# Patient Record
Sex: FEMALE | Race: WHITE | Employment: FULL TIME | URBAN - METROPOLITAN AREA
[De-identification: names, ages, dates, MRNs, and addresses within clinical notes are randomized per-mention and may not be internally consistent; named-entity substitution may affect disease eponyms.]

---

## 2018-10-04 ENCOUNTER — OFFICE VISIT (OUTPATIENT)
Dept: NEUROLOGY | Age: 43
End: 2018-10-04

## 2018-10-04 VITALS
HEIGHT: 66 IN | OXYGEN SATURATION: 98 % | HEART RATE: 88 BPM | WEIGHT: 121 LBS | DIASTOLIC BLOOD PRESSURE: 62 MMHG | SYSTOLIC BLOOD PRESSURE: 105 MMHG | BODY MASS INDEX: 19.44 KG/M2

## 2018-10-04 DIAGNOSIS — G43.711 INTRACTABLE CHRONIC MIGRAINE WITHOUT AURA AND WITH STATUS MIGRAINOSUS: Primary | ICD-10-CM

## 2018-10-04 DIAGNOSIS — M79.10 MYALGIA: ICD-10-CM

## 2018-10-04 PROBLEM — G89.29 CHRONIC BILATERAL LOW BACK PAIN WITH BILATERAL SCIATICA: Status: ACTIVE | Noted: 2018-10-04

## 2018-10-04 PROBLEM — M54.42 CHRONIC BILATERAL LOW BACK PAIN WITH BILATERAL SCIATICA: Status: ACTIVE | Noted: 2018-10-04

## 2018-10-04 PROBLEM — F84.0 AUTISM: Status: ACTIVE | Noted: 2018-10-04

## 2018-10-04 PROBLEM — M54.41 CHRONIC BILATERAL LOW BACK PAIN WITH BILATERAL SCIATICA: Status: ACTIVE | Noted: 2018-10-04

## 2018-10-04 RX ORDER — BUTALBITAL, ACETAMINOPHEN AND CAFFEINE 300; 40; 50 MG/1; MG/1; MG/1
1 CAPSULE ORAL
Qty: 60 CAP | Refills: 5 | Status: SHIPPED | OUTPATIENT
Start: 2018-10-04 | End: 2020-02-14

## 2018-10-04 RX ORDER — DIAZEPAM 2 MG/1
10 TABLET ORAL
Qty: 60 TAB | Refills: 2 | Status: SHIPPED | OUTPATIENT
Start: 2018-10-04 | End: 2019-04-17 | Stop reason: SDUPTHER

## 2018-10-04 NOTE — PROGRESS NOTES
Name: Fortunato Devine Chief Complaint: headaches Marina David returns for a follow up  visit she has been doing well. She continues to get widespread myalgia. She is getting acupuncture She was tested for RA (her mother has it). She was seen by a rheumatologist.  She felt unsatisfied with her lack of diagnosis. We discussed rheumatoid arthritis the necessity for full criteria for the diagnosis. Discussed the etiology of pain with drop in atmospheric pressure. Reviewed my old notes. Last visit was 2016 was on valium and fioricet and has been relatively well maintained. Assesment and Plan 1. Intractable chronic migraine without aura and with status migrainosus Continue fioricet 2. myalgia Continue valium Does not meet criteria for FM diagnosis 3. ADD Stable Allergies Review of patient's allergies indicates no known allergies. Medications No current outpatient prescriptions on file. No current facility-administered medications for this visit. Medical History Migraine headaches Myalgia 
add Review of Systems Constitutional: Negative for chills and fever. HENT: Negative for ear pain. Eyes: Negative for pain and discharge. Respiratory: Negative for cough and hemoptysis. Cardiovascular: Negative for chest pain and claudication. Gastrointestinal: Negative for constipation and diarrhea. Genitourinary: Negative for flank pain and hematuria. Musculoskeletal: Positive for back pain, joint pain and myalgias. Skin: Negative for itching and rash. Neurological: Positive for headaches. Endo/Heme/Allergies: Negative for environmental allergies. Does not bruise/bleed easily. Psychiatric/Behavioral: Negative for depression and hallucinations. The patient is nervous/anxious. Exam: 
Visit Vitals  /62  Pulse 88  Ht 5' 6\" (1.676 m)  Wt 121 lb (54.9 kg)  SpO2 98%  BMI 19.53 kg/m2 General: Well developed, well nourished. Patient in no apparent distress Head: Normocephalic, atraumatic, anicteric sclera Neck Normal ROM, No thyromegally Lungs:  Normal breath sounds and rate Cardiac: Regular rate and rhythm Ext: No pedal edema Skin: Supple no rash NeurologicExam: 
Mental Status: Alert and oriented to person place and time Speech: Fluent no aphasia or dysarthria. Cranial Nerves:  II - XII Intact Motor:  Full and symmetric strength of upper and lower proximal and distal muscles. Normal bulk and tone. Reflexes:   Deep tendon reflexes 2+/4 and symmetric. Gait:  Gait is balanced and fluid with normal arm swing. Tremor:   No tremor noted. Cerebellar:  Coordination intact. Neurovascular: No carotid bruits. No JVD Imaging MRI Results (most recent): 
 
Results from Hospital Encounter encounter on 09/29/10 MRI SPINE CERVICAL WITHOUT CONTRAST Narrative Final Report ICD Codes / Adm. Diagnosis:    /   CERVICOGENIC HEADACHES Examination:  MRI [facility] SPINE  CON  - [id number] - Sep 29 2010  6:21PM 
Accession No:  [id number] Reason:  CERVICOGENIC HEADACH 
 
 
REPORT: 
Cervical spine MRI examination consists of sagittal STIR and T2 and T1 and  
axial gradient-echo and T1-weighted sequences. STIR images demonstrate no pathologic high signal intensity foci in cervical  
spine bone marrow. The cord is of normal size and signal intensity. No tonsillar herniation is  
demonstrated. Disc spaces appear adequately maintained. No intra or extradural mass is demonstrated. IMPRESSION:  Negative Interpreting/Reading Doctor: Tamiko Elizabeth ([id number]) Transcribed: clt on 09/30/2010 Darcy Devi ([id number])  09/30/2010 Distribution:  Attending Doctor: Wendy Rai Alternate Doctor: Wendy Rai

## 2018-10-04 NOTE — PATIENT INSTRUCTIONS

## 2018-10-04 NOTE — MR AVS SNAPSHOT
[address], 
[id number], [address] [facility] 
[phone] Patient: Ziggy Pelletier MRN: [id number] :1975 Visit Information Date & Time Provider Department Dept. Phone Encounter #  
 10/4/2018  9:00 AM Winsome Brown MD [facility] [phone] [id number] Follow-up Instructions Return if symptoms worsen or fail to improve. Upcoming Health Maintenance Date Due Pneumococcal 19-64 Medium Risk (1 of 1 - PPSV23) 10/30/1995 DTaP/Tdap/Td series (1 - Tdap) 10/30/1997 PAP AKA CERVICAL CYTOLOGY 10/30/1997 Influenza Age 5 to Adult 2018 Allergies as of 10/4/2018  Review Complete On: 10/4/2018 By: Winsome Brown MD  
 No Known Allergies Current Immunizations  Never Reviewed No immunizations on file. Not reviewed this visit You Were Diagnosed With   
  
 Codes Comments Intractable chronic migraine without aura and with status migrainosus    -  Primary ICD-10-CM: B59.929 ICD-9-CM: 346.73 Myalgia     ICD-10-CM: M79.10 ICD-9-CM: 729.1 Vitals BP Pulse Height(growth percentile) Weight(growth percentile) SpO2 BMI  
 105/62 88 5' 6\" (1.676 m) 121 lb (54.9 kg) 98% 19.53 kg/m2 OB Status Smoking Status Having regular periods Current Every Day Smoker BMI and BSA Data Body Mass Index Body Surface Area  
 19.53 kg/m 2 1.6 m 2 Preferred Pharmacy Pharmacy Name Phone [facility] [phone] Your Updated Medication List  
  
   
This list is accurate as of 10/4/18 10:07 AM.  Always use your most recent med list.  
  
  
  
  
 butalbital-acetaminophen-caff -40 mg per capsule Commonly known as:  [employer] Take 1 Cap by mouth every twelve (12) hours as needed for Pain.  
  
 diazePAM 2 mg tablet Commonly known as:  VALIUM  
 Take 5 Tabs by mouth every twelve (12) hours as needed for Anxiety. Max Daily Amount: 20 mg.  
  
  
  
  
Prescriptions Printed Refills  
 diazePAM (VALIUM) 2 mg tablet 2 Sig: Take 5 Tabs by mouth every twelve (12) hours as needed for Anxiety. Max Daily Amount: 20 mg.  
 Class: Print Route: Oral  
  
Prescriptions Sent to Pharmacy Refills  
 butalbital-acetaminophen-caff (FIORICET) -40 mg per capsule 5 Sig: Take 1 Cap by mouth every twelve (12) hours as needed for Pain. Class: Normal  
 Pharmacy: [facility] Ph #: [phone] Route: Oral  
  
Follow-up Instructions Return if symptoms worsen or fail to improve. Patient Instructions Office Policies · Phone calls/patient messages: Please allow up to 24 hours for someone in the office to contact you about your call or message. Be mindful your provider may be out of the office or your message may require further review. We encourage you to use [employer] for your messages as this is a faster, more efficient way to communicate with our office · Medication Refills: 
Prescription medications require up to 48 business hours to process. We encourage you to use [employer] for your refills. For controlled medications: Please allow up to 72 business hours to process. Certain medications may require you to  a written prescription at our office. NO narcotic/controlled medications will be prescribed after 4pm Monday through Friday or on weekends · Form/Paperwork Completion: 
Please note there is a $25 fee for all paperwork completed by our providers. We ask that you allow 7-14 business days. Pre-payment is due prior to picking up/faxing the completed form. You may also download your forms to [employer] to have your doctor print off. Introducing \[facility] [place] [facility]\"" & HEALTH SERVICES!    
 [employer] introduces [employer] patient portal. Now you can access parts of your medical record, email your doctor's office, and request medication refills online. 1. In your internet browser, go to [email or web address] 2. Click on the First Time User? Click Here link in the Sign In box. You will see the New Member Sign Up page. 3. Enter your [employer] Access Code exactly as it appears below. You will not need to use this code after youve completed the sign-up process. If you do not sign up before the expiration date, you must request a new code. · [employer] Access Code: [email or web address] Expires: 1/2/2019  8:37 AM 
 
4. Enter the last four digits of your Social Security Number (xxxx) and Date of Birth (mm/dd/yyyy) as indicated and click Submit. You will be taken to the next sign-up page. 5. Create a [employer] ID. This will be your [employer] login ID and cannot be changed, so think of one that is secure and easy to remember. 6. Create a [employer] password. You can change your password at any time. 7. Enter your Password Reset Question and Answer. This can be used at a later time if you forget your password. 8. Enter your e-mail address. You will receive e-mail notification when new information is available in [address] 9. Click Sign Up. You can now view and download portions of your medical record. 10. Click the Download Summary menu link to download a portable copy of your medical information. If you have questions, please visit the Frequently Asked Questions section of the [employer] website. Remember, [employer] is NOT to be used for urgent needs. For medical emergencies, dial 911. Now available from your iPhone and Android! Please provide this summary of care documentation to your next provider. Your primary care clinician is listed as Cassia Randhawa. If you have any questions after today's visit, please call [phone].

## 2018-10-08 ENCOUNTER — TELEPHONE (OUTPATIENT)
Dept: NEUROLOGY | Age: 43
End: 2018-10-08

## 2019-04-17 DIAGNOSIS — M79.10 MYALGIA: ICD-10-CM

## 2019-04-17 RX ORDER — DIAZEPAM 2 MG/1
10 TABLET ORAL
Qty: 60 TAB | Refills: 0 | Status: SHIPPED | OUTPATIENT
Start: 2019-04-17 | End: 2020-02-14

## 2019-04-19 ENCOUNTER — TELEPHONE (OUTPATIENT)
Dept: NEUROLOGY | Age: 44
End: 2019-04-19

## 2019-04-19 NOTE — TELEPHONE ENCOUNTER
[facility] spoke Emerson Weller and provided the script over the phone     diazePAM (VALIUM) 2 mg tablet [[id number]]     Order Details   Dose: 10 mg Route: Oral Frequency: EVERY 12 HOURS AS NEEDED for Anxiety   Note to Pharmacy:   Needs appointment for future refills        Dispense Quantity: 60 Tab Refills: 0 Fills remaining: --           Sig: Take 5 Tabs by mouth every twelve (12) hours as needed for Anxiety. Max Daily Amount: 20 mg.

## 2020-02-14 ENCOUNTER — OFFICE VISIT (OUTPATIENT)
Dept: NEUROLOGY | Age: 45
End: 2020-02-14

## 2020-02-14 VITALS — HEART RATE: 74 BPM | WEIGHT: 132.28 LBS | OXYGEN SATURATION: 98 % | BODY MASS INDEX: 21.26 KG/M2 | HEIGHT: 66 IN

## 2020-02-14 DIAGNOSIS — G43.711 INTRACTABLE CHRONIC MIGRAINE WITHOUT AURA AND WITH STATUS MIGRAINOSUS: ICD-10-CM

## 2020-02-14 RX ORDER — ALPRAZOLAM 0.5 MG/1
0.5 TABLET ORAL
Qty: 60 TAB | Refills: 3 | Status: SHIPPED | OUTPATIENT
Start: 2020-02-14 | End: 2021-01-08 | Stop reason: SDUPTHER

## 2020-02-14 RX ORDER — BUTALBITAL, ACETAMINOPHEN AND CAFFEINE 50; 325; 40 MG/1; MG/1; MG/1
1 TABLET ORAL
Qty: 60 TAB | Refills: 5 | Status: SHIPPED | OUTPATIENT
Start: 2020-02-14 | End: 2021-01-08 | Stop reason: SDUPTHER

## 2020-02-14 NOTE — PROGRESS NOTES
Name: Vasiliy Officer    Chief Complaint: headaches    Patient comes for a follow up visit. She has increased anxiety which has resulted in increased generalized pain. She states that the Valium works well for her muscle aches. I asked her to choose between the 2 medications but she cannot be on both. She preferred to take the alprazolam as she had taken in the past.    Assesment and Plan  1. Intractable chronic migraine without aura and with status migrainosus  Continue fioricet    2. myalgia  Discontinue Valium  Start alprazolam  Urged her to follow-up with rheumatology  Medication, rationale, route of administration, adverse reactions and alternatives were discussed in detail and all questions were answered. 3. ADD    Stable    Allergies  Patient has no known allergies. Medications  Current Outpatient Medications   Medication Sig    diazePAM (VALIUM) 2 mg tablet Take 5 Tabs by mouth every twelve (12) hours as needed for Anxiety. Max Daily Amount: 20 mg.    butalbital-acetaminophen-caff (FIORICET) -40 mg per capsule Take 1 Cap by mouth every twelve (12) hours as needed for Pain. No current facility-administered medications for this visit. Medical History  Migraine headaches  Myalgia  add    Review of Systems   Constitutional: Negative for chills and fever. HENT: Negative for ear pain. Eyes: Negative for pain and discharge. Respiratory: Negative for cough and hemoptysis. Cardiovascular: Negative for chest pain and claudication. Gastrointestinal: Negative for constipation and diarrhea. Genitourinary: Negative for flank pain and hematuria. Musculoskeletal: Positive for back pain, joint pain and myalgias. Skin: Negative for itching and rash. Neurological: Positive for headaches. Endo/Heme/Allergies: Negative for environmental allergies. Does not bruise/bleed easily. Psychiatric/Behavioral: Negative for depression and hallucinations. The patient is nervous/anxious. Exam:  Visit Vitals  Pulse 74   Ht 5' 6\" (1.676 m)   Wt 132 lb 4.4 oz (60 kg)   SpO2 98%   BMI 21.35 kg/m²      General: Well developed, well nourished. Patient in no apparent distress   Head: Normocephalic, atraumatic, anicteric sclera   Neck Normal ROM, No thyromegally   Cardiac: Regular rate and rhythm   Ext: No pedal edema   Skin: Supple no rash     NeurologicExam:  Mental Status: Alert and oriented to person place and time   Speech: Fluent no aphasia or dysarthria. Cranial Nerves:  II - XII Intact   Motor:  Full and symmetric strength . Normal bulk and tone. Sensory:   Symmetric and intact    Gait:  Gait is balanced and fluid with normal arm swing. Tremor:   No tremor noted. Cerebellar:  Coordination intact. Imaging  MRI Results (most recent):    Results from Hospital Encounter encounter on 09/29/10   MRI SPINE CERVICAL WITHOUT CONTRAST   Narrative Final Report      ICD Codes / Adm. Diagnosis:    /   CERVICOGENIC HEADACHES  Examination:  MRI [facility] CON  - [id number] - Sep 29 2010  6:21PM  Accession No:  [id number]  Reason:  CERVICOGENIC [facility]      REPORT:  Cervical spine MRI examination consists of sagittal STIR and T2 and T1 and   axial gradient-echo and T1-weighted sequences. STIR images demonstrate no pathologic high signal intensity foci in cervical   spine bone marrow. The cord is of normal size and signal intensity. No tonsillar herniation is   demonstrated. Disc spaces appear adequately maintained. No intra or extradural mass is demonstrated.        IMPRESSION:  Negative          Interpreting/Reading Doctor: Frankie Powell ([id number])  Transcribed: angie on 09/30/2010  Approved: Frankie Powell ([id number])  09/30/2010          Distribution:  Attending Doctor: Phillips Nissen  Alternate Doctor: Phillips Nissen

## 2020-08-19 ENCOUNTER — TELEPHONE (OUTPATIENT)
Dept: NEUROLOGY | Age: 45
End: 2020-08-19

## 2020-08-19 NOTE — TELEPHONE ENCOUNTER
----- Message from Radha Santa sent at 2020  2:15 PM EDT -----  Regarding: [place]/MD/TELEPHONE  Contact: [phone]  General Message/Vendor Call     Patient's first and last name:  Jose Romo  :  1975      Caller's first and last name:  Jose Romo    Reason for call: Request to have medical records fax     Callback required yes/no and why: Yes. To confirm  Best contact number(s): [phone]    Details to clarify the request: Patient requesting to have medical records fax to [place] [facility] Fax#  [phone].

## 2021-01-08 ENCOUNTER — VIRTUAL VISIT (OUTPATIENT)
Dept: NEUROLOGY | Age: 46
End: 2021-01-08
Payer: COMMERCIAL

## 2021-01-08 DIAGNOSIS — D03.59 MELANOMA IN SITU OF BACK (HCC): ICD-10-CM

## 2021-01-08 DIAGNOSIS — F41.9 ANXIETY: ICD-10-CM

## 2021-01-08 DIAGNOSIS — G43.711 INTRACTABLE CHRONIC MIGRAINE WITHOUT AURA AND WITH STATUS MIGRAINOSUS: Primary | ICD-10-CM

## 2021-01-08 DIAGNOSIS — M79.10 MYALGIA: ICD-10-CM

## 2021-01-08 PROCEDURE — 99214 OFFICE O/P EST MOD 30 MIN: CPT | Performed by: PSYCHIATRY & NEUROLOGY

## 2021-01-08 RX ORDER — BUTALBITAL, ACETAMINOPHEN AND CAFFEINE 50; 325; 40 MG/1; MG/1; MG/1
1 TABLET ORAL
Qty: 60 TAB | Refills: 5 | Status: SHIPPED | OUTPATIENT
Start: 2021-01-08 | End: 2021-08-10 | Stop reason: SDUPTHER

## 2021-01-08 RX ORDER — GALCANEZUMAB 120 MG/ML
120 INJECTION, SOLUTION SUBCUTANEOUS
Qty: 1 ML | Refills: 11 | Status: SHIPPED | OUTPATIENT
Start: 2021-01-08

## 2021-01-08 RX ORDER — ALPRAZOLAM 0.5 MG/1
0.5 TABLET ORAL
Qty: 60 TAB | Refills: 3 | Status: SHIPPED | OUTPATIENT
Start: 2021-01-08 | End: 2021-08-10 | Stop reason: SDUPTHER

## 2021-01-08 RX ORDER — GALCANEZUMAB 120 MG/ML
240 INJECTION, SOLUTION SUBCUTANEOUS ONCE
Qty: 2 ML | Refills: 0 | Status: SHIPPED | COMMUNITY
Start: 2021-01-08 | End: 2021-01-08

## 2021-01-08 RX ORDER — PROMETHAZINE HYDROCHLORIDE 25 MG/1
25 TABLET ORAL
Qty: 60 TAB | Refills: 3 | Status: SHIPPED | OUTPATIENT
Start: 2021-01-08

## 2021-01-08 RX ORDER — DIAZEPAM 2 MG/1
10 TABLET ORAL
Qty: 60 TAB | Refills: 3 | Status: SHIPPED | OUTPATIENT
Start: 2021-01-08 | End: 2021-08-10 | Stop reason: SDUPTHER

## 2021-01-08 NOTE — PROGRESS NOTES
Name: Clay Donald    Chief Complaint: headaches    Returns for follow-up visit. The alprazolam did not work for the myalgia. She continues to have situational anxiety especially since losing her employment. She continues to have severe migraines that only affect the right side of the face. She was recently diagnosed with melanoma and underwent surgical resection off her back. She seems to have recovered well from this. We discussed melanoma and the importance of sunblock. We also discussed briefly the pathophysiology of cancers. Assesment and Plan  1. Intractable chronic migraine without aura and with status migrainosus  Continue fioricet  She has thus far failed topiramate, propranolol, Norvasc and amitriptyline as preventative therapy. These were tried more than 2 months. She has suffered from migraines for over 3 decades. These headaches are right unilateral throbbing pain associated with photophobia lasting up to 3 days. Trial of Emgality  Medication, rationale, route of administration, adverse reactions and alternatives were discussed in detail and all questions were answered. 2. myalgia  Start Valium  Urged her to follow-up with rheumatology  Medication, rationale, route of administration, adverse reactions and alternatives were discussed in detail and all questions were answered. 3. ADD    Stable    4. Anxiety  Continue xanax    5. Melanoma  In situ s/p resection    Allergies  Patient has no known allergies. Medications  Current Outpatient Medications   Medication Sig    ALPRAZolam (XANAX) 0.5 mg tablet Take 1 Tab by mouth nightly as needed for Anxiety. Max Daily Amount: 0.5 mg.    butalbital-acetaminophen-caffeine (FIORICET, ESGIC) -40 mg per tablet Take 1 Tab by mouth every six (6) hours as needed for Headache. No current facility-administered medications for this visit.          Medical History  Migraine headaches  Myalgia  add    Review of Systems   Constitutional: Negative for chills and fever. HENT: Negative for ear pain. Eyes: Negative for pain and discharge. Respiratory: Negative for cough and hemoptysis. Cardiovascular: Negative for chest pain and claudication. Gastrointestinal: Negative for constipation and diarrhea. Genitourinary: Negative for flank pain and hematuria. Musculoskeletal: Positive for back pain, joint pain and myalgias. Skin: Negative for itching and rash. Neurological: Positive for headaches. Endo/Heme/Allergies: Negative for environmental allergies. Does not bruise/bleed easily. Psychiatric/Behavioral: Negative for depression and hallucinations. The patient is nervous/anxious. Exam:    General: Well developed, well nourished. Patient in no apparent distress   Head: Normocephalic, atraumatic, anicteric sclera   Neck Normal ROM, No thyromegally   Lungs: Normal effort   Ext: No pedal edema   Skin: Supple no rash     NeurologicExam:  Mental Status: Alert and oriented to person place and time   Speech: Fluent no aphasia or dysarthria. Cranial Nerves:  II - XII Intact   Motor:  Full and symmetric strength . Normal bulk and tone. Sensory:   Symmetric and intact    Gait:  Gait is balanced and fluid with normal arm swing. Tremor:   No tremor noted. Cerebellar:  Coordination intact. Imaging  MRI Results (most recent):    Results from Hospital Encounter encounter on 09/29/10   MRI SPINE CERVICAL WITHOUT CONTRAST   Narrative Final Report      ICD Codes / Adm. Diagnosis:    /   CERVICOGENIC HEADACHES  Examination:  MRI Justino Arellano CON  - [id number] - Sep 29 2010  6:21PM  Accession No:  [id number]  Reason:  CERVICOGENIC [facility]      REPORT:  Cervical spine MRI examination consists of sagittal STIR and T2 and T1 and   axial gradient-echo and T1-weighted sequences. STIR images demonstrate no pathologic high signal intensity foci in cervical   spine bone marrow. The cord is of normal size and signal intensity.  No tonsillar herniation is   demonstrated. Disc spaces appear adequately maintained. No intra or extradural mass is demonstrated. IMPRESSION:  Negative          Interpreting/Reading Doctor: Abraham Schmidt ([id number])  Transcribed: clt on 09/30/2010  Approved: Abraham Schmidt ([id number])  09/30/2010          Distribution:  Attending Doctor: Satish Calderon  Alternate Doctor: Rakesh Son was seen by synchronous (real-time) audio-video technology on 01/08/21. Consent:  She and/or her healthcare decision maker is aware that this patient-initiated Telehealth encounter is a billable service, with coverage as determined by her insurance carrier. She is aware that she may receive a bill and has provided verbal consent to proceed: Yes    I was in the office while conducting this encounter. Pursuant to the emergency declaration under the [facility], 1135 waiver authority and the [employer] Act, this Virtual  Visit was conducted, with patient's consent, to reduce the patient's risk of exposure to COVID-19 and provide continuity of care for an established patient. Services were provided through a video synchronous discussion virtually to substitute for in-person clinic visit.

## 2021-01-11 ENCOUNTER — TELEPHONE (OUTPATIENT)
Dept: NEUROLOGY | Age: 46
End: 2021-01-11

## 2021-01-11 NOTE — TELEPHONE ENCOUNTER
----- Message from Federico Vernon sent at 1/11/2021 10:16 AM EST -----  Regarding: Dr. Tico Chatterjee Message/Vendor Calls    Caller's first and last name: Pt      Reason for call: speak w/ nurse      Callback required yes/no and why: Yes, NA      Best contact number(s):  [phone]      Details to clarify the request: Pt calling said she just got off the phone with nurse and is requesting to speak w/ her again. Did not provide any additional information.       Federico Vernon

## 2021-01-11 NOTE — TELEPHONE ENCOUNTER
Writer called patient as she was supposed to  sample of Emgality Friday but never did.  She agreed to come by again this am but didn't show up as well